# Patient Record
Sex: FEMALE | Race: WHITE | Employment: OTHER | URBAN - METROPOLITAN AREA
[De-identification: names, ages, dates, MRNs, and addresses within clinical notes are randomized per-mention and may not be internally consistent; named-entity substitution may affect disease eponyms.]

---

## 2017-04-18 PROCEDURE — 87086 URINE CULTURE/COLONY COUNT: CPT | Performed by: UROLOGY

## 2017-04-18 PROCEDURE — 81001 URINALYSIS AUTO W/SCOPE: CPT | Performed by: UROLOGY

## 2018-08-27 PROBLEM — M17.11 PRIMARY OSTEOARTHRITIS OF RIGHT KNEE: Status: ACTIVE | Noted: 2018-08-27

## 2018-08-27 PROCEDURE — 87077 CULTURE AEROBIC IDENTIFY: CPT | Performed by: PHYSICIAN ASSISTANT

## 2018-08-27 PROCEDURE — 87186 SC STD MICRODIL/AGAR DIL: CPT | Performed by: PHYSICIAN ASSISTANT

## 2018-08-27 PROCEDURE — 87086 URINE CULTURE/COLONY COUNT: CPT | Performed by: PHYSICIAN ASSISTANT

## 2018-09-07 PROCEDURE — 81001 URINALYSIS AUTO W/SCOPE: CPT | Performed by: INTERNAL MEDICINE

## 2018-09-10 PROCEDURE — 87086 URINE CULTURE/COLONY COUNT: CPT | Performed by: UROLOGY

## 2018-09-24 PROCEDURE — 87086 URINE CULTURE/COLONY COUNT: CPT | Performed by: PHYSICIAN ASSISTANT

## 2018-10-15 NOTE — H&P
[facility]    PATIENT'S NAME: Alesia Souza   ATTENDING PHYSICIAN: Surinder Coribn MD   PATIENT ACCOUNT#:   [id number]    LOCATION:  [facility]  MEDICAL RECORD #:   [id number]       YOB: 1940  ADMISSION DATE:       10/18/2018 knee, she has crepitus in the patellofemoral joint. Tenderness most pronounced over the medial joint space. She has full extension, flexes to 120 degrees. Calves nontender.     Radiographs of the right knee on August 27 show end-stage primary arthritis w

## 2018-10-18 ENCOUNTER — APPOINTMENT (OUTPATIENT)
Dept: GENERAL RADIOLOGY | Facility: HOSPITAL | Age: 78
DRG: 470 | End: 2018-10-18
Attending: PHYSICIAN ASSISTANT
Payer: MEDICARE

## 2018-10-18 ENCOUNTER — ANESTHESIA EVENT (OUTPATIENT)
Dept: SURGERY | Facility: HOSPITAL | Age: 78
DRG: 470 | End: 2018-10-18
Payer: MEDICARE

## 2018-10-18 ENCOUNTER — ANESTHESIA (OUTPATIENT)
Dept: SURGERY | Facility: HOSPITAL | Age: 78
DRG: 470 | End: 2018-10-18
Payer: MEDICARE

## 2018-10-18 ENCOUNTER — HOSPITAL ENCOUNTER (INPATIENT)
Facility: HOSPITAL | Age: 78
LOS: 2 days | Discharge: HOME HEALTH | DRG: 470 | End: 2018-10-20
Attending: ORTHOPAEDIC SURGERY | Admitting: ORTHOPAEDIC SURGERY
Payer: MEDICARE

## 2018-10-18 DIAGNOSIS — M25.561 CHRONIC PAIN OF RIGHT KNEE: ICD-10-CM

## 2018-10-18 DIAGNOSIS — M17.11 PRIMARY OSTEOARTHRITIS OF RIGHT KNEE: ICD-10-CM

## 2018-10-18 DIAGNOSIS — G89.29 CHRONIC PAIN OF RIGHT KNEE: ICD-10-CM

## 2018-10-18 DIAGNOSIS — Z01.818 PREOP TESTING: Primary | ICD-10-CM

## 2018-10-18 PROCEDURE — 97110 THERAPEUTIC EXERCISES: CPT

## 2018-10-18 PROCEDURE — 88311 DECALCIFY TISSUE: CPT | Performed by: ORTHOPAEDIC SURGERY

## 2018-10-18 PROCEDURE — 97116 GAIT TRAINING THERAPY: CPT

## 2018-10-18 PROCEDURE — 0SRC0J9 REPLACEMENT OF RIGHT KNEE JOINT WITH SYNTHETIC SUBSTITUTE, CEMENTED, OPEN APPROACH: ICD-10-PCS | Performed by: ORTHOPAEDIC SURGERY

## 2018-10-18 PROCEDURE — 97162 PT EVAL MOD COMPLEX 30 MIN: CPT

## 2018-10-18 PROCEDURE — 88305 TISSUE EXAM BY PATHOLOGIST: CPT | Performed by: ORTHOPAEDIC SURGERY

## 2018-10-18 PROCEDURE — 86901 BLOOD TYPING SEROLOGIC RH(D): CPT | Performed by: ORTHOPAEDIC SURGERY

## 2018-10-18 PROCEDURE — 97530 THERAPEUTIC ACTIVITIES: CPT

## 2018-10-18 PROCEDURE — 86900 BLOOD TYPING SEROLOGIC ABO: CPT | Performed by: ORTHOPAEDIC SURGERY

## 2018-10-18 PROCEDURE — 36415 COLL VENOUS BLD VENIPUNCTURE: CPT | Performed by: ORTHOPAEDIC SURGERY

## 2018-10-18 PROCEDURE — 86850 RBC ANTIBODY SCREEN: CPT | Performed by: ORTHOPAEDIC SURGERY

## 2018-10-18 PROCEDURE — 73560 X-RAY EXAM OF KNEE 1 OR 2: CPT | Performed by: PHYSICIAN ASSISTANT

## 2018-10-18 DEVICE — PSN FEM PS CMT CCR STD SZ5 R: Type: IMPLANTABLE DEVICE | Site: KNEE | Status: FUNCTIONAL

## 2018-10-18 DEVICE — PSN TIB STM 5 DEG SZ E R: Type: IMPLANTABLE DEVICE | Site: KNEE | Status: FUNCTIONAL

## 2018-10-18 DEVICE — PSN ALL POLY PAT PLY 35MM: Type: IMPLANTABLE DEVICE | Site: KNEE | Status: FUNCTIONAL

## 2018-10-18 DEVICE — CEMENT BN SMPX PTBR FD RADOPQ: Type: IMPLANTABLE DEVICE | Site: KNEE | Status: FUNCTIONAL

## 2018-10-18 RX ORDER — ACETAMINOPHEN 500 MG
1000 TABLET ORAL EVERY 6 HOURS PRN
Status: ON HOLD | COMMUNITY
End: 2018-10-20

## 2018-10-18 RX ORDER — ONDANSETRON 2 MG/ML
4 INJECTION INTRAMUSCULAR; INTRAVENOUS ONCE AS NEEDED
Status: DISCONTINUED | OUTPATIENT
Start: 2018-10-18 | End: 2018-10-18 | Stop reason: HOSPADM

## 2018-10-18 RX ORDER — LIDOCAINE HYDROCHLORIDE 10 MG/ML
INJECTION, SOLUTION EPIDURAL; INFILTRATION; INTRACAUDAL; PERINEURAL AS NEEDED
Status: DISCONTINUED | OUTPATIENT
Start: 2018-10-18 | End: 2018-10-18 | Stop reason: SURG

## 2018-10-18 RX ORDER — SODIUM CHLORIDE, SODIUM LACTATE, POTASSIUM CHLORIDE, CALCIUM CHLORIDE 600; 310; 30; 20 MG/100ML; MG/100ML; MG/100ML; MG/100ML
INJECTION, SOLUTION INTRAVENOUS CONTINUOUS
Status: DISCONTINUED | OUTPATIENT
Start: 2018-10-18 | End: 2018-10-18 | Stop reason: HOSPADM

## 2018-10-18 RX ORDER — MIDAZOLAM HYDROCHLORIDE 1 MG/ML
INJECTION INTRAMUSCULAR; INTRAVENOUS AS NEEDED
Status: DISCONTINUED | OUTPATIENT
Start: 2018-10-18 | End: 2018-10-18 | Stop reason: SURG

## 2018-10-18 RX ORDER — DOCUSATE SODIUM 100 MG/1
100 CAPSULE, LIQUID FILLED ORAL 2 TIMES DAILY
Status: DISCONTINUED | OUTPATIENT
Start: 2018-10-18 | End: 2018-10-20

## 2018-10-18 RX ORDER — SODIUM CHLORIDE 0.9 % (FLUSH) 0.9 %
10 SYRINGE (ML) INJECTION AS NEEDED
Status: DISCONTINUED | OUTPATIENT
Start: 2018-10-18 | End: 2018-10-20

## 2018-10-18 RX ORDER — FAMOTIDINE 20 MG/1
20 TABLET ORAL ONCE
Status: DISCONTINUED | OUTPATIENT
Start: 2018-10-18 | End: 2018-10-18 | Stop reason: HOSPADM

## 2018-10-18 RX ORDER — DIPHENHYDRAMINE HYDROCHLORIDE 50 MG/ML
25 INJECTION INTRAMUSCULAR; INTRAVENOUS ONCE AS NEEDED
Status: ACTIVE | OUTPATIENT
Start: 2018-10-18 | End: 2018-10-18

## 2018-10-18 RX ORDER — ACETAMINOPHEN 500 MG
1000 TABLET ORAL ONCE
Status: DISCONTINUED | OUTPATIENT
Start: 2018-10-18 | End: 2018-10-18

## 2018-10-18 RX ORDER — ACETAMINOPHEN 325 MG/1
650 TABLET ORAL EVERY 6 HOURS PRN
Status: DISCONTINUED | OUTPATIENT
Start: 2018-10-18 | End: 2018-10-20

## 2018-10-18 RX ORDER — SODIUM CHLORIDE, SODIUM LACTATE, POTASSIUM CHLORIDE, CALCIUM CHLORIDE 600; 310; 30; 20 MG/100ML; MG/100ML; MG/100ML; MG/100ML
INJECTION, SOLUTION INTRAVENOUS CONTINUOUS
Status: DISCONTINUED | OUTPATIENT
Start: 2018-10-18 | End: 2018-10-20

## 2018-10-18 RX ORDER — CEFAZOLIN SODIUM/WATER 2 G/20 ML
2 SYRINGE (ML) INTRAVENOUS EVERY 8 HOURS
Status: COMPLETED | OUTPATIENT
Start: 2018-10-18 | End: 2018-10-19

## 2018-10-18 RX ORDER — MORPHINE SULFATE 10 MG/ML
6 INJECTION, SOLUTION INTRAMUSCULAR; INTRAVENOUS EVERY 10 MIN PRN
Status: DISCONTINUED | OUTPATIENT
Start: 2018-10-18 | End: 2018-10-18 | Stop reason: HOSPADM

## 2018-10-18 RX ORDER — ACETAMINOPHEN 500 MG
1000 TABLET ORAL ONCE
Status: DISCONTINUED | OUTPATIENT
Start: 2018-10-18 | End: 2018-10-18 | Stop reason: HOSPADM

## 2018-10-18 RX ORDER — BUPIVACAINE HYDROCHLORIDE 7.5 MG/ML
INJECTION, SOLUTION INTRASPINAL AS NEEDED
Status: DISCONTINUED | OUTPATIENT
Start: 2018-10-18 | End: 2018-10-18 | Stop reason: SURG

## 2018-10-18 RX ORDER — NALOXONE HYDROCHLORIDE 0.4 MG/ML
80 INJECTION, SOLUTION INTRAMUSCULAR; INTRAVENOUS; SUBCUTANEOUS AS NEEDED
Status: DISCONTINUED | OUTPATIENT
Start: 2018-10-18 | End: 2018-10-18 | Stop reason: HOSPADM

## 2018-10-18 RX ORDER — GABAPENTIN 300 MG/1
300 CAPSULE ORAL NIGHTLY
Status: DISCONTINUED | OUTPATIENT
Start: 2018-10-18 | End: 2018-10-20

## 2018-10-18 RX ORDER — METOCLOPRAMIDE 10 MG/1
10 TABLET ORAL ONCE
Status: COMPLETED | OUTPATIENT
Start: 2018-10-18 | End: 2018-10-18

## 2018-10-18 RX ORDER — MORPHINE SULFATE 4 MG/ML
4 INJECTION, SOLUTION INTRAMUSCULAR; INTRAVENOUS EVERY 10 MIN PRN
Status: DISCONTINUED | OUTPATIENT
Start: 2018-10-18 | End: 2018-10-18 | Stop reason: HOSPADM

## 2018-10-18 RX ORDER — DIPHENHYDRAMINE HYDROCHLORIDE 50 MG/ML
12.5 INJECTION INTRAMUSCULAR; INTRAVENOUS EVERY 4 HOURS PRN
Status: DISCONTINUED | OUTPATIENT
Start: 2018-10-18 | End: 2018-10-20

## 2018-10-18 RX ORDER — MAGNESIUM HYDROXIDE 1200 MG/15ML
LIQUID ORAL CONTINUOUS PRN
Status: COMPLETED | OUTPATIENT
Start: 2018-10-18 | End: 2018-10-18

## 2018-10-18 RX ORDER — HYDROCODONE BITARTRATE AND ACETAMINOPHEN 10; 325 MG/1; MG/1
1 TABLET ORAL EVERY 4 HOURS PRN
Status: DISCONTINUED | OUTPATIENT
Start: 2018-10-18 | End: 2018-10-20

## 2018-10-18 RX ORDER — DIPHENHYDRAMINE HCL 25 MG
25 CAPSULE ORAL EVERY 4 HOURS PRN
Status: DISCONTINUED | OUTPATIENT
Start: 2018-10-18 | End: 2018-10-20

## 2018-10-18 RX ORDER — POLYETHYLENE GLYCOL 3350 17 G/17G
17 POWDER, FOR SOLUTION ORAL DAILY PRN
Status: DISCONTINUED | OUTPATIENT
Start: 2018-10-18 | End: 2018-10-20

## 2018-10-18 RX ORDER — FAMOTIDINE 20 MG/1
20 TABLET ORAL ONCE
Status: COMPLETED | OUTPATIENT
Start: 2018-10-18 | End: 2018-10-18

## 2018-10-18 RX ORDER — METOCLOPRAMIDE HYDROCHLORIDE 5 MG/ML
10 INJECTION INTRAMUSCULAR; INTRAVENOUS EVERY 6 HOURS PRN
Status: DISCONTINUED | OUTPATIENT
Start: 2018-10-18 | End: 2018-10-19

## 2018-10-18 RX ORDER — SODIUM CHLORIDE 9 MG/ML
INJECTION, SOLUTION INTRAVENOUS CONTINUOUS
Status: DISCONTINUED | OUTPATIENT
Start: 2018-10-18 | End: 2018-10-20

## 2018-10-18 RX ORDER — METOCLOPRAMIDE 10 MG/1
10 TABLET ORAL ONCE
Status: DISCONTINUED | OUTPATIENT
Start: 2018-10-18 | End: 2018-10-18 | Stop reason: HOSPADM

## 2018-10-18 RX ORDER — BISACODYL 10 MG
10 SUPPOSITORY, RECTAL RECTAL
Status: DISCONTINUED | OUTPATIENT
Start: 2018-10-18 | End: 2018-10-20

## 2018-10-18 RX ORDER — MORPHINE SULFATE 4 MG/ML
2 INJECTION, SOLUTION INTRAMUSCULAR; INTRAVENOUS EVERY 10 MIN PRN
Status: DISCONTINUED | OUTPATIENT
Start: 2018-10-18 | End: 2018-10-18 | Stop reason: HOSPADM

## 2018-10-18 RX ORDER — HALOPERIDOL 5 MG/ML
0.25 INJECTION INTRAMUSCULAR ONCE AS NEEDED
Status: DISCONTINUED | OUTPATIENT
Start: 2018-10-18 | End: 2018-10-18 | Stop reason: HOSPADM

## 2018-10-18 RX ORDER — MULTIVIT-MIN/IRON/FOLIC ACID/K 18-600-40
CAPSULE ORAL DAILY
COMMUNITY

## 2018-10-18 RX ORDER — EPHEDRINE SULFATE 50 MG/ML
INJECTION, SOLUTION INTRAVENOUS AS NEEDED
Status: DISCONTINUED | OUTPATIENT
Start: 2018-10-18 | End: 2018-10-18 | Stop reason: SURG

## 2018-10-18 RX ORDER — CEFAZOLIN SODIUM/WATER 2 G/20 ML
2 SYRINGE (ML) INTRAVENOUS ONCE
Status: COMPLETED | OUTPATIENT
Start: 2018-10-18 | End: 2018-10-18

## 2018-10-18 RX ORDER — SENNOSIDES 8.6 MG
17.2 TABLET ORAL NIGHTLY
Status: DISCONTINUED | OUTPATIENT
Start: 2018-10-18 | End: 2018-10-20

## 2018-10-18 RX ORDER — HYDROCODONE BITARTRATE AND ACETAMINOPHEN 10; 325 MG/1; MG/1
2 TABLET ORAL EVERY 4 HOURS PRN
Status: DISCONTINUED | OUTPATIENT
Start: 2018-10-18 | End: 2018-10-20

## 2018-10-18 RX ORDER — ONDANSETRON 2 MG/ML
4 INJECTION INTRAMUSCULAR; INTRAVENOUS EVERY 4 HOURS PRN
Status: DISCONTINUED | OUTPATIENT
Start: 2018-10-18 | End: 2018-10-20

## 2018-10-18 RX ORDER — HYDROCODONE BITARTRATE AND ACETAMINOPHEN 5; 325 MG/1; MG/1
2 TABLET ORAL AS NEEDED
Status: DISCONTINUED | OUTPATIENT
Start: 2018-10-18 | End: 2018-10-18 | Stop reason: HOSPADM

## 2018-10-18 RX ORDER — HYDROCODONE BITARTRATE AND ACETAMINOPHEN 5; 325 MG/1; MG/1
1 TABLET ORAL AS NEEDED
Status: DISCONTINUED | OUTPATIENT
Start: 2018-10-18 | End: 2018-10-18 | Stop reason: HOSPADM

## 2018-10-18 RX ORDER — CELECOXIB 200 MG/1
200 CAPSULE ORAL 2 TIMES DAILY
Status: DISCONTINUED | OUTPATIENT
Start: 2018-10-19 | End: 2018-10-20

## 2018-10-18 RX ORDER — SODIUM PHOSPHATE, DIBASIC AND SODIUM PHOSPHATE, MONOBASIC 7; 19 G/133ML; G/133ML
1 ENEMA RECTAL ONCE AS NEEDED
Status: DISCONTINUED | OUTPATIENT
Start: 2018-10-18 | End: 2018-10-20

## 2018-10-18 RX ADMIN — MIDAZOLAM HYDROCHLORIDE 0.5 MG: 1 INJECTION INTRAMUSCULAR; INTRAVENOUS at 10:30:00

## 2018-10-18 RX ADMIN — EPHEDRINE SULFATE 5 MG: 50 INJECTION, SOLUTION INTRAVENOUS at 10:21:00

## 2018-10-18 RX ADMIN — CEFAZOLIN SODIUM/WATER 2 G: 2 G/20 ML SYRINGE (ML) INTRAVENOUS at 10:00:00

## 2018-10-18 RX ADMIN — BUPIVACAINE HYDROCHLORIDE 1.2 ML: 7.5 INJECTION, SOLUTION INTRASPINAL at 10:06:00

## 2018-10-18 RX ADMIN — MIDAZOLAM HYDROCHLORIDE 1 MG: 1 INJECTION INTRAMUSCULAR; INTRAVENOUS at 10:02:00

## 2018-10-18 RX ADMIN — LIDOCAINE HYDROCHLORIDE 25 MG: 10 INJECTION, SOLUTION EPIDURAL; INFILTRATION; INTRACAUDAL; PERINEURAL at 10:08:00

## 2018-10-18 RX ADMIN — SODIUM CHLORIDE, SODIUM LACTATE, POTASSIUM CHLORIDE, CALCIUM CHLORIDE: 600; 310; 30; 20 INJECTION, SOLUTION INTRAVENOUS at 12:00:00

## 2018-10-18 RX ADMIN — SODIUM CHLORIDE, SODIUM LACTATE, POTASSIUM CHLORIDE, CALCIUM CHLORIDE: 600; 310; 30; 20 INJECTION, SOLUTION INTRAVENOUS at 09:59:00

## 2018-10-18 RX ADMIN — MIDAZOLAM HYDROCHLORIDE 0.5 MG: 1 INJECTION INTRAMUSCULAR; INTRAVENOUS at 11:07:00

## 2018-10-18 NOTE — ANESTHESIA POSTPROCEDURE EVALUATION
Patient: Adria Knight    Procedure Summary     Date:  10/18/18 Room / Location:  [facility] OR  / [facility] OR    Anesthesia Start:  1161 Anesthesia Stop:      Procedure:  KNEE TOTAL REPLACEMENT (Right Knee) Diagnosis:       Primary osteoarthritis of rig

## 2018-10-18 NOTE — HOME CARE LIAISON
Received referral from Uriel Mckay. Met with patient and daughter at the bedside. Patient is agreeable to [facility], [facility], pending orders. Brochure and liaison's card provided with contact information. All questions addressed and answered.

## 2018-10-18 NOTE — H&P
[facility] Hospitalist H&P     CC: right knee OA     PCP: Katina Hsieh MD    Admission Date: 10/18/2018    ASSESSMENT / PLAN:   Mrs. Tushar Bains is a 76year old female with PMH of DVT 2016, HTN, OA who presentes for planned right knee total arthroplasty.     #Ri 11/14/14    Cysto-Karen   • UPPER GI ENDOSCOPY,DIAGNOSIS  3/16/16    wnl        ALL:    Ace Inhibitors          Coughing     Home Medications:    Outpatient Medications Marked as Taking for the 10/18/18 encounter [facility] Encounter):  Cholecalciferol (VI Data:    CBC/Chem    No results for input(s): RBC, HGB, HCT, MCV, MCH, MCHC, RDW, NEPRELIM, WBC, PLT in the last 168 hours. No results for input(s): GLU, BUN, CREATSERUM, GFRAA, GFRNAA, CA, NA, K, CL, CO2 in the last 168 hours.        No results for in

## 2018-10-18 NOTE — ANESTHESIA PREPROCEDURE EVALUATION
Anesthesia PreOp Note    HPI:     Sofi Duke is a 76year old female who presents for preoperative consultation requested by: Satya Christensen MD    Date of Surgery: 10/18/2018    Procedure(s):  KNEE TOTAL REPLACEMENT  Indication: Primary osteoarthr daily. Begin this medication after antibiotics complete. Disp: 90 capsule Rfl: 2 10/17/2018 at 2100   acetaminophen 325 MG Oral Tab Take 325 mg by mouth every 6 (six) hours as needed for Pain.  Disp:  Rfl:  Past Month at Unknown time   aspirin 325 MG Oral T on file      Transportation needs - medical: Not on file      Transportation needs - non-medical: Not on file    Occupational History      Not on file    Tobacco Use      Smoking status: Never Smoker      Smokeless tobacco: Never Used    Substance and Sexu Discussed With:  Patient and child/children  Discussed plan with:  CRNA and surgeon      I have informed Rosalia Stephenson and/or legal guardian or family member of the nature of the anesthetic plan, benefits, risks including possible dental damage if relevan

## 2018-10-18 NOTE — CM/SW NOTE
SW met with the patient at bedside. The patient lives with her  and son in 2 story house with 12 steps inside. The patient responds being independent prior to admission with all ADL's and  Ambulation. She does not drive. Patient denies use of any DME

## 2018-10-18 NOTE — OPERATIVE REPORT
[facility]    PATIENT'S NAME: Joe Handler   ATTENDING PHYSICIAN: Surinder Andrews MD   OPERATING PHYSICIAN: Surinder Andrews MD   PATIENT ACCOUNT#:   [id number]    LOCATION:  [facility] [address] PACU 7 [facility] 10  MEDICAL RECORD #:   [id number] pressure points well padded. The right lower extremity was prepped and draped in the usual sterile fashion. Time-out was performed. The limb was not exsanguinated at the beginning of the case.   Surgical procedures were begun with a longitudinal incision achieved including full extension. The patella was then measured to a 35 round, and at the appropriate position punched, and a trial was placed, and good tracking was achieved.   With the components in place, the limb was exsanguinated and the tourniquet i

## 2018-10-18 NOTE — INTERVAL H&P NOTE
Pre-op Diagnosis: Primary osteoarthritis of right knee [M17.11]  Chronic pain of right knee [M25.561, G89.29]    The above referenced H&P was reviewed by Trey Arroyo MD on 10/18/2018, the patient was examined and no significant changes have occurred

## 2018-10-18 NOTE — BRIEF OP NOTE
Pre-Operative Diagnosis: Primary osteoarthritis of right knee [M17.11]  Chronic pain of right knee [M25.561, G89.29]     Post-Operative Diagnosis: Primary osteoarthritis of right knee [M17.11]Chronic pain of right knee [M25.561, G89.29]      Procedure Perf

## 2018-10-18 NOTE — PHYSICAL THERAPY NOTE
PHYSICAL THERAPY KNEE EVALUATION - INPATIENT       Room Number: 426/426-A  Evaluation Date: 10/18/2018  Type of Evaluation: Initial  Physician Order: PT Eval and Treat    Presenting Problem: R TKA  Reason for Therapy: Mobility Dysfunction and Discharge Gualberto training;Stair training;Transfer training;Balance training;Strengthening;Range of motion  Rehab Potential : Good  Frequency (Obs): BID       PHYSICAL THERAPY MEDICAL/SOCIAL HISTORY   Problem List  Active Problems:    Primary osteoarthritis of right knee AM-PAC '6-Clicks' INPATIENT SHORT FORM - BASIC MOBILITY  How much difficulty does the patient currently have. ..  -   Turning over in bed (including adjusting bedclothes, sheets and blankets)?: A Little   -   Sit assistive device and supervision   Goal #4   Current Status    Goal #5  AROM 0 degrees extension to 95 degrees flexion     Goal #5   Current Status    Goal #6 Patient independently performs home exercise program for ROM/strengthening per the instructions p

## 2018-10-18 NOTE — ANESTHESIA PROCEDURE NOTES
Spinal Block  Performed by: Alana Arenas CRNA  Authorized by: Maurice Parmar MD     Patient Location:  OR  Start Time:  10/18/2018 10:02 AM  End Time:  10/18/2018 10:06 AM  Site identification: surface landmarks    Reason for Block: at surgeon's re

## 2018-10-19 PROCEDURE — 97166 OT EVAL MOD COMPLEX 45 MIN: CPT

## 2018-10-19 PROCEDURE — 85027 COMPLETE CBC AUTOMATED: CPT | Performed by: PHYSICIAN ASSISTANT

## 2018-10-19 PROCEDURE — 97535 SELF CARE MNGMENT TRAINING: CPT

## 2018-10-19 PROCEDURE — 80048 BASIC METABOLIC PNL TOTAL CA: CPT | Performed by: PHYSICIAN ASSISTANT

## 2018-10-19 PROCEDURE — 97110 THERAPEUTIC EXERCISES: CPT

## 2018-10-19 PROCEDURE — 97116 GAIT TRAINING THERAPY: CPT

## 2018-10-19 RX ORDER — METOCLOPRAMIDE HYDROCHLORIDE 5 MG/ML
5 INJECTION INTRAMUSCULAR; INTRAVENOUS EVERY 6 HOURS PRN
Status: DISCONTINUED | OUTPATIENT
Start: 2018-10-19 | End: 2018-10-20

## 2018-10-19 RX ORDER — HYDROCODONE BITARTRATE AND ACETAMINOPHEN 5; 325 MG/1; MG/1
1 TABLET ORAL EVERY 6 HOURS PRN
Status: DISCONTINUED | OUTPATIENT
Start: 2018-10-19 | End: 2018-10-20

## 2018-10-19 NOTE — PHYSICAL THERAPY NOTE
PHYSICAL THERAPY KNEE TREATMENT NOTE - INPATIENT     Room Number: 426/426-A         Presenting Problem: R TKA    Problem List  Active Problems:    Primary osteoarthritis of right knee    Preop testing      ASSESSMENT   Patient received supine in bed; agree Sitting: Good  Static Standing: Fair +  Dynamic Standing: Fair    ACTIVITY TOLERANCE  Pulse: (103-113)  Heart Rate Source: Monitor                   O2 WALK  SPO2 on Room Air at Rest: 93  SPO2 Ambulation on Room Air: 87            AM-PAC '6-Clicks' MYRIAM assistance level: modified independent with rolling walker       Goal #2  Current Status Supervision   Goal #3 Patient is able to ambulate 300 feet with assistive device at assistance level: modified independent with rolling walker    Goal #3   Current Sta

## 2018-10-19 NOTE — PROGRESS NOTES
[facility] Note:  Renal Dose Adjustment for Metoclopramide (REGLAN)    Sonya Iglesias has been prescribed Metoclopramide (REGLAN) 10 mg every 6 hours as needed for n/v.    Estimated Creatinine Clearance: 39.5 mL/min (based on SCr of 0.83 mg/dL).     Her c

## 2018-10-19 NOTE — PLAN OF CARE
HEMATOLOGIC - ADULT    • Maintains hematologic stability Progressing    • Free from bleeding injury Progressing        Michelle Quiroga started on xarelto. Scds on bilaterally while in bed.     MUSCULOSKELETAL - ADULT    • Return mobility to safest level of function

## 2018-10-19 NOTE — OCCUPATIONAL THERAPY NOTE
OCCUPATIONAL THERAPY EVALUATION - INPATIENT      Room Number: 426/426-A  Evaluation Date: 10/19/2018  Type of Evaluation: Initial  Presenting Problem: R TKA    Physician Order: IP Consult to Occupational Therapy  Reason for Therapy: ADL/IADL Dysfunction an call light within reach and needs met. DISCHARGE RECOMMENDATIONS  OT Discharge Recommendations: Home;24 hour care/supervision       PLAN  OT Treatment Plan: Balance activities; Energy conservation/work simplification techniques;ADL training;Functional TOLERANCE  Post activity: 86% RA  113  After 5 min: 93%  107     COGNITION  Overall Cognitive Status:  WFL - within functional limits  Pt impulsive at times during activity --requiring cues for RW management to maximize safety     RANGE OF MOTION   Upper e self care task at sink level with SPV   Comment:     Comment:         Goals  on:   Frequency: 1-3 additional sessions

## 2018-10-19 NOTE — CM/SW NOTE
CTL rounds: met with patient and her spouse at bedside. Patient is BPCI eligible under . [employer] program including 90 day phone call follow up reviewed with patient who verbalized understanding. [employer] Brochure provided.

## 2018-10-19 NOTE — PROGRESS NOTES
Lukas Crawford remains non compliant with calling for help to ambulate. She has been instructed to use bathroom call light and not even stand up without staff being present. Pt and granddaughter were instructed to call for help when done in bathroom.   Nurse ente

## 2018-10-19 NOTE — PHYSICAL THERAPY NOTE
PHYSICAL THERAPY KNEE TREATMENT NOTE - INPATIENT     Room Number: 426/426-A             Presenting Problem: R TKA    Problem List  Active Problems:    Primary osteoarthritis of right knee    Preop testing      ASSESSMENT   Patient received supine in bed; a Rating: Unable to rate  Location: R knee- increases with movement, decreases at rest  Management Techniques:  Activity promotion;Repositioning;Breathing techniques    BALANCE  Static Sitting: Good  Dynamic Sitting: Good  Static Standing: Fair +  Dynamic S @ level: modified independent      Goal #1   Current Status  Goal met   Goal #2 Patient is able to demonstrate transfers Sit to/from Stand at assistance level: modified independent with rolling walker       Goal #2  Current Status  SBA   Goal #3 Patient is

## 2018-10-19 NOTE — PROGRESS NOTES
POD#1 s/p Right TKA  Pain well controlled on orals  No nausea    Xray - Right TKA in good postion  Lab - mild acute anemia - tolerated    Dressing clean and dry  Calf's non tender, DNVI    Imp: Good early POD#1 Right TKA    Plan: Xeralto for 4 weeks  Rehab

## 2018-10-19 NOTE — CM/SW NOTE
[facility] accepted the patient. SW informed [facility] of patient's DC today, 10.19.18. Specific [facility]HC orders entered.      ERWIN Brandt., [id number]

## 2018-10-20 VITALS
RESPIRATION RATE: 18 BRPM | SYSTOLIC BLOOD PRESSURE: 118 MMHG | HEIGHT: 60 IN | WEIGHT: 173 LBS | DIASTOLIC BLOOD PRESSURE: 74 MMHG | OXYGEN SATURATION: 95 % | HEART RATE: 95 BPM | TEMPERATURE: 99 F | BODY MASS INDEX: 33.96 KG/M2

## 2018-10-20 PROCEDURE — 97110 THERAPEUTIC EXERCISES: CPT

## 2018-10-20 PROCEDURE — A4216 STERILE WATER/SALINE, 10 ML: HCPCS | Performed by: PHYSICIAN ASSISTANT

## 2018-10-20 PROCEDURE — 85027 COMPLETE CBC AUTOMATED: CPT | Performed by: PHYSICIAN ASSISTANT

## 2018-10-20 PROCEDURE — 97116 GAIT TRAINING THERAPY: CPT

## 2018-10-20 RX ORDER — ACETAMINOPHEN 500 MG
500 TABLET ORAL EVERY 6 HOURS PRN
Qty: 30 TABLET | Refills: 0 | Status: SHIPPED | COMMUNITY
Start: 2018-10-20 | End: 2020-03-02 | Stop reason: ALTCHOICE

## 2018-10-20 NOTE — PLAN OF CARE
HEMATOLOGIC - ADULT    • Maintains hematologic stability Progressing    • Free from bleeding injury Progressing        Xarelto for dvt prophylaxis. Scds in place.     MUSCULOSKELETAL - ADULT    • Return mobility to safest level of function Progressing    •

## 2018-10-20 NOTE — PROGRESS NOTES
[facility] Hospitalist Progress Note     Reason for Admission:   PCP: Harshad Wilkerson MD     Assessment/Plan:     Active Problems:    Primary osteoarthritis of right knee    Preop testing      Mrs. Licha Dhillon is a 76year old female with PMH of DVT 2016, HTN, OA who Affect- normal  SKIN: warm, dry  EXT: no edema          Data Review:       Labs:     Recent Labs   Lab  10/19/18   0514   RBC  3.49*   HGB  10.5*   HCT  32.0*   MCV  91.5   MCH  30.0   MCHC  32.8   RDW  13.4   WBC  6.2   PLT  106*         Recent Labs   Lab

## 2018-10-20 NOTE — PROGRESS NOTES
[facility]    Progress Note    Davion Sun Patient Status:  Inpatient    10/25/1940 MRN [id number]   Location [facility] 4W/SW/SE Attending Callie Talley MD   Hosp Day # 2 PCP Katina Hsieh MD     SUBJECTIVE:  Interval

## 2018-10-20 NOTE — PHYSICAL THERAPY NOTE
PHYSICAL THERAPY KNEE TREATMENT NOTE - INPATIENT     Room Number: 426/426-A             Presenting Problem: R TKA    Problem List  Active Problems:    Primary osteoarthritis of right knee    Preop testing      ASSESSMENT   Pt was working on seated thera ex currently need. ..   -   Moving to and from a bed to a chair (including a wheelchair)?: A Little   -   Need to walk in hospital room?: A Little   -   Climbing 3-5 steps with a railing?: A Little     AM-PAC Score:  Raw Score: 20   PT Approx Degree of Impairm extension to 95 degrees flexion     Goal #5   Current Status  Goal met 98*   Goal #6 Patient independently performs home exercise program for ROM/strengthening per the instructions provided in preparation for discharge.    Goal #6  Current Status  in progre

## 2018-10-20 NOTE — DISCHARGE SUMMARY
[facility] Hospitalist Discharge Summary   Patient ID:  Luz Marina Anderson [id number]  76year old 10/25/1940    Admit date: 10/18/2018  Discharge date: 10/20/2018  Risk of Readmission Lace+ Score: 28  59-90 High Risk  29-58 Medium Risk  0-28   Low Risk    Primary Ca CONTINUE taking these medications    BIOHM PROBIOTIC SUPPLEMENT OR     celecoxib 200 MG Caps  Commonly known as:  CELEBREX  TAKE 2 CAPS THE DAY PRIOR TO SURGERY, 2 CAPS THE MORNING OF SURGERY WITH A SIP OF WATER, 1 CAP DAILY AFTER SURGERY     cephALEX

## 2019-02-20 PROBLEM — Z96.651 S/P TOTAL KNEE REPLACEMENT, RIGHT: Status: ACTIVE | Noted: 2019-02-20

## 2019-03-19 PROBLEM — M17.11 PRIMARY OSTEOARTHRITIS OF RIGHT KNEE: Status: RESOLVED | Noted: 2018-08-27 | Resolved: 2019-03-19

## 2020-04-15 PROBLEM — I82.401 ACUTE DEEP VEIN THROMBOSIS (DVT) OF RIGHT LOWER EXTREMITY, UNSPECIFIED VEIN (HCC): Status: ACTIVE | Noted: 2020-04-15

## 2020-04-15 PROBLEM — I26.99 ACUTE PULMONARY EMBOLISM WITHOUT ACUTE COR PULMONALE, UNSPECIFIED PULMONARY EMBOLISM TYPE (HCC): Status: ACTIVE | Noted: 2020-04-15

## 2020-04-24 PROBLEM — D68.51 FACTOR V LEIDEN MUTATION (HCC): Status: ACTIVE | Noted: 2020-04-24

## 2020-04-24 PROBLEM — D69.6 DECREASED PLATELET COUNT (HCC): Status: ACTIVE | Noted: 2020-04-24

## 2021-01-20 PROBLEM — Z79.01 LONG TERM (CURRENT) USE OF ANTICOAGULANTS: Status: ACTIVE | Noted: 2021-01-20

## 2021-01-20 PROBLEM — Z86.718 HISTORY OF RECURRENT DEEP VEIN THROMBOSIS (DVT): Status: ACTIVE | Noted: 2020-04-15

## 2021-01-20 PROBLEM — I70.0 AORTIC ATHEROSCLEROSIS (HCC): Status: ACTIVE | Noted: 2021-01-20

## 2021-01-20 PROBLEM — Z86.711 HISTORY OF PULMONARY EMBOLISM: Status: ACTIVE | Noted: 2020-04-15

## 2021-05-08 PROBLEM — I35.8 AORTIC VALVE SCLEROSIS: Status: ACTIVE | Noted: 2021-05-08

## 2021-05-08 PROBLEM — Z96.653 HISTORY OF BILATERAL KNEE REPLACEMENT: Status: ACTIVE | Noted: 2019-02-20

## 2021-10-07 PROBLEM — R03.0 ELEVATED BP WITHOUT DIAGNOSIS OF HYPERTENSION: Status: ACTIVE | Noted: 2021-10-07

## 2021-10-07 PROBLEM — M54.6 THORACIC SPINE PAIN: Status: ACTIVE | Noted: 2021-10-07

## (undated) DEVICE — ZIMMER® STERILE DISPOSABLE TOURNIQUET CUFF WITH PLC, DUAL PORT, SINGLE BLADDER, 34 IN. (86 CM)

## (undated) DEVICE — T5 HOOD WITH PEEL AWAY FACE SHIELD

## (undated) DEVICE — CHLORAPREP ORANGE TINT 10.5ML

## (undated) DEVICE — SUTURE VICRYL 0 CP-1

## (undated) DEVICE — PAD THRP 16IN WRPON MU LNG STM

## (undated) DEVICE — BATTERY

## (undated) DEVICE — TIBIAL FEMALE HEX

## (undated) DEVICE — TRAY FOLEY BDX 16F STATLOCK

## (undated) DEVICE — CONTAINER SPEC STR 4OZ GRY LID

## (undated) DEVICE — SOL  .9 3000ML

## (undated) DEVICE — PADDING 4YDX6IN CTTN STRL WBRL

## (undated) DEVICE — STERILE POLYISOPRENE POWDER-FREE SURGICAL GLOVES: Brand: PROTEXIS

## (undated) DEVICE — 60 ML SYRINGE LUER-LOCK TIP: Brand: MONOJECT

## (undated) DEVICE — SUTURE VLOC 90 3-0 23\" 0034

## (undated) DEVICE — STERILE TETRA-FLEX CF, ELASTIC BANDAGE, 4" X 5.5YD: Brand: TETRA-FLEX™CF

## (undated) DEVICE — DRAPE SHEET LG

## (undated) DEVICE — PLASTC TOOMEY SYRNG DISP

## (undated) DEVICE — DRESSING 10X4IN ANMC SAFETAC

## (undated) DEVICE — TROCAR PIN

## (undated) DEVICE — SUTURE ETHIBOND 1 CT-1

## (undated) DEVICE — CHLORAPREP 26ML APPLICATOR

## (undated) DEVICE — Device

## (undated) DEVICE — TOTAL KNEE: Brand: MEDLINE INDUSTRIES, INC.

## (undated) DEVICE — STERILE LATEX POWDER-FREE SURGICAL GLOVESWITH NITRILE COATING: Brand: PROTEXIS

## (undated) DEVICE — DERMABOND LIQUID ADHESIVE

## (undated) DEVICE — SUTURE VICRYL 2-0 CT-1

## (undated) DEVICE — POLAR CARE CUBE COOLING UNIT

## (undated) DEVICE — DRAPE SRG 70X60IN SPLT U IMPRV

## (undated) DEVICE — SOL  .9 1000ML BTL

## (undated) DEVICE — BOWL CEMENT MIX QUICK-VAC

## (undated) DEVICE — 3M™ STERI-DRAPE™ INSTRUMENT POUCH 1018: Brand: STERI-DRAPE™

## (undated) DEVICE — BLADE SAW SAGITTAL 19.5

## (undated) DEVICE — SUCTION TUBING

## (undated) DEVICE — NEEDLE SPINAL 20X3-1/2 YELLOW

## (undated) DEVICE — DUAL CUT SAGITTAL BLADE

## (undated) DEVICE — ELECTRODE BALL 5MM DBL-511

## (undated) DEVICE — GAUZE SPONGES,12 PLY: Brand: CURITY

## (undated) DEVICE — SURETRANS AUTOTRANSFUSION SYSTEM FOR ORTHOPAEDICS WITH PVC DRAIN AND 2 TROCARS: Brand: SURETRANS AUTOTRANSFUSION SYSTEM FOR ORTHOPAEDICS